# Patient Record
Sex: FEMALE | Race: WHITE | Employment: UNEMPLOYED | URBAN - METROPOLITAN AREA
[De-identification: names, ages, dates, MRNs, and addresses within clinical notes are randomized per-mention and may not be internally consistent; named-entity substitution may affect disease eponyms.]

---
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
## 2024-01-12 ENCOUNTER — HOSPITAL ENCOUNTER (EMERGENCY)
Age: 19
Discharge: HOME | End: 2024-01-13
Attending: EMERGENCY MEDICINE

## 2024-01-12 DIAGNOSIS — R11.0 NAUSEA: ICD-10-CM

## 2024-01-12 DIAGNOSIS — M54.50 ACUTE BILATERAL LOW BACK PAIN WITHOUT SCIATICA: Primary | ICD-10-CM

## 2024-01-12 PROCEDURE — 99283 EMERGENCY DEPT VISIT LOW MDM: CPT

## 2024-01-12 RX ORDER — ONDANSETRON 4 MG/1
4 TABLET, FILM COATED ORAL 3 TIMES DAILY PRN
Qty: 12 TABLET | Refills: 0 | Status: SHIPPED | OUTPATIENT
Start: 2024-01-12

## 2024-01-12 ASSESSMENT — ENCOUNTER SYMPTOMS
DIARRHEA: 0
BACK PAIN: 1
VOMITING: 1
ABDOMINAL PAIN: 1
NAUSEA: 1
CONSTIPATION: 0
SHORTNESS OF BREATH: 0

## 2024-01-12 ASSESSMENT — LIFESTYLE VARIABLES
HOW OFTEN DO YOU HAVE A DRINK CONTAINING ALCOHOL: NEVER
HOW MANY STANDARD DRINKS CONTAINING ALCOHOL DO YOU HAVE ON A TYPICAL DAY: PATIENT DOES NOT DRINK

## 2024-01-12 ASSESSMENT — PAIN - FUNCTIONAL ASSESSMENT: PAIN_FUNCTIONAL_ASSESSMENT: 0-10

## 2024-01-13 VITALS
OXYGEN SATURATION: 98 % | BODY MASS INDEX: 24.84 KG/M2 | HEIGHT: 62 IN | HEART RATE: 76 BPM | TEMPERATURE: 98.2 F | DIASTOLIC BLOOD PRESSURE: 89 MMHG | SYSTOLIC BLOOD PRESSURE: 116 MMHG | RESPIRATION RATE: 16 BRPM | WEIGHT: 135 LBS

## 2024-01-13 ASSESSMENT — PAIN - FUNCTIONAL ASSESSMENT: PAIN_FUNCTIONAL_ASSESSMENT: 0-10

## 2024-01-13 ASSESSMENT — PAIN SCALES - GENERAL: PAINLEVEL_OUTOF10: 0

## 2024-01-13 NOTE — ED NOTES
I have reviewed discharge instructions with the patient.  The patient verbalized understanding.    Patient left ED via Discharge Method: ambulatory to Home with family     Opportunity for questions and clarification provided.       Patient given 1 scripts.         To continue your aftercare when you leave the hospital, you may receive an automated call from our care team to check in on how you are doing.  This is a free service and part of our promise to provide the best care and service to meet your aftercare needs.” If you have questions, or wish to unsubscribe from this service please call [phone].  Thank you for Choosing our [facility] Emergency Department.

## 2024-01-13 NOTE — ED TRIAGE NOTES
Patient ambulatory to triage with c/o nausea, lower back cramping x 1 week. Pt states she is late on her menstrual cycle.

## 2024-01-13 NOTE — ED PROVIDER NOTES
resting comfortably in chair.  I explained the results so far.  They do not want any further testing such as labs or imaging at this point.  Patient was discharged home with primary care follow-up. [CW]      ED Course User Index  [CW] Alexx Lovell MD       I have considered all emergent medical conditions that could have caused patient's presentation to the emergency department today.  Based on their history, physical, and thorough evaluation, I have excluded all emergent medical conditions that require any further evaluation today in the ED or hospital.  I feel that the patient is safe for discharge.  The diagnosis and plan as well as the results of any testing (if done) and treatments (if done) today in the emergency department were communicated to the patient and/or their family/caregiver (if present).  The patient/caregiver verbalized understanding and compliance with the treatment plan.  Strict emergency department return precautions were given.  All questions and concerns were answered.      ICD-10-CM    1. Acute bilateral low back pain without sciatica  M54.50       2. Nausea  R11.0           DISPOSITION Decision To Discharge 01/12/2024 11:49:30 PM       Voice dictation software was used during the making of this note.  This software is not perfect and grammatical and other typographical errors may be present.  This note has not been completely proofread for errors.     Alexx Lovell MD  01/12/24 6345